# Patient Record
Sex: MALE | Race: WHITE | NOT HISPANIC OR LATINO | Employment: FULL TIME | URBAN - METROPOLITAN AREA
[De-identification: names, ages, dates, MRNs, and addresses within clinical notes are randomized per-mention and may not be internally consistent; named-entity substitution may affect disease eponyms.]

---

## 2021-03-02 ENCOUNTER — IMMUNIZATION (OUTPATIENT)
Dept: VACCINE CLINIC | Facility: HOSPITAL | Age: 36
End: 2021-03-02

## 2021-03-02 PROCEDURE — 91300 HC SARSCOV02 VAC 30MCG/0.3ML IM: CPT | Performed by: INTERNAL MEDICINE

## 2021-03-02 PROCEDURE — 0001A: CPT | Performed by: INTERNAL MEDICINE

## 2021-03-23 ENCOUNTER — IMMUNIZATION (OUTPATIENT)
Dept: VACCINE CLINIC | Facility: HOSPITAL | Age: 36
End: 2021-03-23

## 2021-03-23 PROCEDURE — 91300 HC SARSCOV02 VAC 30MCG/0.3ML IM: CPT | Performed by: INTERNAL MEDICINE

## 2021-03-23 PROCEDURE — 0002A: CPT | Performed by: INTERNAL MEDICINE

## 2021-10-06 ENCOUNTER — IMMUNIZATION (OUTPATIENT)
Dept: VACCINE CLINIC | Facility: HOSPITAL | Age: 36
End: 2021-10-06

## 2021-10-06 ENCOUNTER — APPOINTMENT (OUTPATIENT)
Dept: VACCINE CLINIC | Facility: HOSPITAL | Age: 36
End: 2021-10-06

## 2021-10-06 PROCEDURE — 0004A ADM SARSCOV2 30MCG/0.3ML BOOSTER: CPT | Performed by: INTERNAL MEDICINE

## 2021-10-06 PROCEDURE — 0003A: CPT | Performed by: INTERNAL MEDICINE

## 2021-10-06 PROCEDURE — 91300 HC SARSCOV02 VAC 30MCG/0.3ML IM: CPT | Performed by: INTERNAL MEDICINE

## 2023-04-03 ENCOUNTER — OFFICE VISIT (OUTPATIENT)
Dept: SPORTS MEDICINE | Facility: CLINIC | Age: 38
End: 2023-04-03
Payer: COMMERCIAL

## 2023-04-03 VITALS
SYSTOLIC BLOOD PRESSURE: 110 MMHG | HEIGHT: 71 IN | HEART RATE: 83 BPM | WEIGHT: 203 LBS | BODY MASS INDEX: 28.42 KG/M2 | OXYGEN SATURATION: 98 % | DIASTOLIC BLOOD PRESSURE: 78 MMHG | TEMPERATURE: 98.7 F

## 2023-04-03 DIAGNOSIS — S89.91XA INJURY OF RIGHT LOWER EXTREMITY, INITIAL ENCOUNTER: Primary | ICD-10-CM

## 2023-04-03 NOTE — PROGRESS NOTES
"Chief Complaint  Leg Pain (RT LOWER LEG- had a \"trauma\" had Xray and there was no fratcures)    Subjective        Angel Fitzpatrick presents to [facility]  History of Present Illness  2 weeks ago he was helping someone push a car which was in neutral, patient slipped in the will rolled over his right lower leg.  He initially had pain and swelling all the way down to the foot and ankle.  Patient went to urgent care center for evaluation and x-rays there were negative.  Patient states that his leg is feeling better, still occasionally gets some discomfort in the proximal medial lower leg but is weightbearing and walking without difficulty.  No paresthesias or noted weakness.  Objective   Vital Signs:  /78 (BP Location: Left arm, Patient Position: Sitting, Cuff Size: Adult)   Pulse 83   Temp 98.7 °F (37.1 °C) (Temporal)   Ht 180.3 cm (70.98\")   Wt 92.1 kg (203 lb)   SpO2 98%   BMI 28.33 kg/m²   Estimated body mass index is 28.33 kg/m² as calculated from the following:    Height as of this encounter: 180.3 cm (70.98\").    Weight as of this encounter: 92.1 kg (203 lb).             Physical Exam  Vitals reviewed.   Constitutional:       Appearance: He is well-developed.   HENT:      Head: Normocephalic and atraumatic.   Eyes:      Conjunctiva/sclera: Conjunctivae normal.      Pupils: Pupils are equal, round, and reactive to light.   Cardiovascular:      Comments: No peripheral edema  Pulmonary:      Effort: Pulmonary effort is normal.   Musculoskeletal:      Comments: Right lower leg normal in general appearance.  No palpable cords.  Negative Jonathan.  No tenderness to percussion over the tibia.  No tenderness to palpation along the length of the tibia.  No edema.  Ankle with full painless range of motion.  Motor 5 out of 5 neurovascular intact.   Skin:     General: Skin is warm and dry.   Neurological:      Mental Status: He is alert and oriented to person, place, and time. "   Psychiatric:         Behavior: Behavior normal.        Result Review :          XR Foot 3+ View Right (03/24/2023 15:44)  XR Ankle 3+ View Right (03/24/2023 15:44)  XR Tibia Fibula 2 View Right (03/24/2023 15:43)  Urgent Care Provider Note by Elena Cruz APRN (03/24/2023 15:59)           Assessment and Plan   Diagnoses and all orders for this visit:    1. Injury of right lower extremity, initial encounter (Primary)      Patient's exam is unremarkable, neuromotor function appears normal and no evidence of DVT.  Would recommend a compression sock for the next couple of weeks.  Follow-up 2 weeks if his leg is not back to normal or sooner if needed.       Follow Up   No follow-ups on file.  Patient was given instructions and counseling regarding his condition or for health maintenance advice. Please see specific information pulled into the AVS if appropriate.

## 2023-04-06 ENCOUNTER — PATIENT ROUNDING (BHMG ONLY) (OUTPATIENT)
Dept: SPORTS MEDICINE | Facility: CLINIC | Age: 38
End: 2023-04-06
Payer: COMMERCIAL

## 2023-04-06 NOTE — PROGRESS NOTES
April 6, 2023    A Welcome Card has been sent to the patient for PATIENT ROUNDING with [facility] [place]